# Patient Record
Sex: FEMALE | Race: WHITE | Employment: FULL TIME | RURAL
[De-identification: names, ages, dates, MRNs, and addresses within clinical notes are randomized per-mention and may not be internally consistent; named-entity substitution may affect disease eponyms.]

---

## 2024-06-14 PROBLEM — E66.811 CLASS 1 OBESITY DUE TO EXCESS CALORIES WITHOUT SERIOUS COMORBIDITY WITH BODY MASS INDEX (BMI) OF 31.0 TO 31.9 IN ADULT: Status: ACTIVE | Noted: 2024-06-14

## 2024-08-30 ENCOUNTER — OFFICE VISIT (OUTPATIENT)
Facility: CLINIC | Age: 24
End: 2024-08-30
Payer: COMMERCIAL

## 2024-08-30 VITALS
WEIGHT: 180 LBS | OXYGEN SATURATION: 100 % | DIASTOLIC BLOOD PRESSURE: 77 MMHG | TEMPERATURE: 98 F | BODY MASS INDEX: 31.89 KG/M2 | SYSTOLIC BLOOD PRESSURE: 112 MMHG | RESPIRATION RATE: 16 BRPM | HEART RATE: 84 BPM | HEIGHT: 63 IN

## 2024-08-30 DIAGNOSIS — K21.9 GASTROESOPHAGEAL REFLUX DISEASE WITHOUT ESOPHAGITIS: Primary | ICD-10-CM

## 2024-08-30 PROCEDURE — 99214 OFFICE O/P EST MOD 30 MIN: CPT | Performed by: FAMILY MEDICINE

## 2024-08-30 RX ORDER — SUCRALFATE 1 G/1
1 TABLET ORAL 4 TIMES DAILY
Qty: 120 TABLET | Refills: 2 | Status: SHIPPED | OUTPATIENT
Start: 2024-08-30

## 2024-08-30 NOTE — PROGRESS NOTES
Chief Complaint   Patient presents with    GI Problem     Eating hurts stomach, nausea. Occasional vomiting. Worse within the past 2-3 weeks. Ongoing for awhile.         \"Have you been to the ER, urgent care clinic since your last visit?  Hospitalized since your last visit?\"    NO    “Have you seen or consulted any other health care providers outside of [facility] since your last visit?”    NO     “Have you had a pap smear?”    NO    No cervical cancer screening on file             Click Here for Release of Records Request     Health Maintenance Due   Topic Date Due    Hepatitis C screen  Never done    DTaP/Tdap/Td vaccine (1 - Tdap) Never done    Pap smear  Never done    COVID-19 Vaccine (1 - 2023-24 season) Never done    Flu vaccine (1) Never done

## 2024-11-13 ENCOUNTER — APPOINTMENT (OUTPATIENT)
Facility: HOSPITAL | Age: 24
End: 2024-11-13
Payer: COMMERCIAL

## 2024-11-13 ENCOUNTER — HOSPITAL ENCOUNTER (EMERGENCY)
Facility: HOSPITAL | Age: 24
Discharge: HOME | End: 2024-11-13
Attending: EMERGENCY MEDICINE
Payer: COMMERCIAL

## 2024-11-13 VITALS
WEIGHT: 180 LBS | HEART RATE: 78 BPM | SYSTOLIC BLOOD PRESSURE: 122 MMHG | HEIGHT: 63 IN | OXYGEN SATURATION: 98 % | TEMPERATURE: 98.3 F | DIASTOLIC BLOOD PRESSURE: 75 MMHG | RESPIRATION RATE: 18 BRPM | BODY MASS INDEX: 31.89 KG/M2

## 2024-11-13 DIAGNOSIS — K21.9 GASTROESOPHAGEAL REFLUX DISEASE WITHOUT ESOPHAGITIS: ICD-10-CM

## 2024-11-13 DIAGNOSIS — R07.9 CHEST PAIN, UNSPECIFIED TYPE: Primary | ICD-10-CM

## 2024-11-13 LAB
ALBUMIN SERPL-MCNC: 4.2 G/DL (ref 3.5–5)
ALBUMIN/GLOB SERPL: 1.1 (ref 1.1–2.2)
ALP SERPL-CCNC: 71 U/L (ref 45–117)
ALT SERPL-CCNC: 18 U/L (ref 12–78)
ANION GAP SERPL CALC-SCNC: 7 MMOL/L (ref 2–12)
AST SERPL-CCNC: 11 U/L (ref 15–37)
BASOPHILS # BLD: 0.1 K/UL (ref 0–0.1)
BASOPHILS NFR BLD: 1 % (ref 0–1)
BILIRUB SERPL-MCNC: 0.2 MG/DL (ref 0.2–1)
BUN SERPL-MCNC: 15 MG/DL (ref 6–20)
BUN/CREAT SERPL: 18 (ref 12–20)
CALCIUM SERPL-MCNC: 9.1 MG/DL (ref 8.5–10.1)
CHLORIDE SERPL-SCNC: 104 MMOL/L (ref 97–108)
CO2 SERPL-SCNC: 28 MMOL/L (ref 21–32)
CREAT SERPL-MCNC: 0.84 MG/DL (ref 0.55–1.02)
DIFFERENTIAL METHOD BLD: ABNORMAL
EOSINOPHIL # BLD: 0.4 K/UL (ref 0–0.4)
EOSINOPHIL NFR BLD: 6 % (ref 0–7)
ERYTHROCYTE [DISTWIDTH] IN BLOOD BY AUTOMATED COUNT: 12.6 % (ref 11.5–14.5)
GLOBULIN SER CALC-MCNC: 3.7 G/DL (ref 2–4)
GLUCOSE SERPL-MCNC: 91 MG/DL (ref 65–100)
HCG SERPL QL: NEGATIVE
HCT VFR BLD AUTO: 39.1 % (ref 35–47)
HGB BLD-MCNC: 13.2 G/DL (ref 11.5–16)
IMM GRANULOCYTES # BLD AUTO: 0.1 K/UL (ref 0–0.04)
IMM GRANULOCYTES NFR BLD AUTO: 1 % (ref 0–0.5)
LYMPHOCYTES # BLD: 2.7 K/UL (ref 0.8–3.5)
LYMPHOCYTES NFR BLD: 34 % (ref 12–49)
MCH RBC QN AUTO: 31.2 PG (ref 26–34)
MCHC RBC AUTO-ENTMCNC: 33.8 G/DL (ref 30–36.5)
MCV RBC AUTO: 92.4 FL (ref 80–99)
MONOCYTES # BLD: 0.6 K/UL (ref 0–1)
MONOCYTES NFR BLD: 8 % (ref 5–13)
NEUTS SEG # BLD: 4.1 K/UL (ref 1.8–8)
NEUTS SEG NFR BLD: 50 % (ref 32–75)
NRBC # BLD: 0 K/UL (ref 0–0.01)
NRBC BLD-RTO: 0 PER 100 WBC
PLATELET # BLD AUTO: 280 K/UL (ref 150–400)
PMV BLD AUTO: 9.7 FL (ref 8.9–12.9)
POTASSIUM SERPL-SCNC: 3.5 MMOL/L (ref 3.5–5.1)
PROT SERPL-MCNC: 7.9 G/DL (ref 6.4–8.2)
RBC # BLD AUTO: 4.23 M/UL (ref 3.8–5.2)
SODIUM SERPL-SCNC: 139 MMOL/L (ref 136–145)
WBC # BLD AUTO: 8 K/UL (ref 3.6–11)

## 2024-11-13 PROCEDURE — 71260 CT THORAX DX C+: CPT

## 2024-11-13 PROCEDURE — 84703 CHORIONIC GONADOTROPIN ASSAY: CPT

## 2024-11-13 PROCEDURE — 93005 ELECTROCARDIOGRAM TRACING: CPT | Performed by: EMERGENCY MEDICINE

## 2024-11-13 PROCEDURE — 85025 COMPLETE CBC W/AUTO DIFF WBC: CPT

## 2024-11-13 PROCEDURE — 80053 COMPREHEN METABOLIC PANEL: CPT

## 2024-11-13 PROCEDURE — 36415 COLL VENOUS BLD VENIPUNCTURE: CPT

## 2024-11-13 PROCEDURE — 99285 EMERGENCY DEPT VISIT HI MDM: CPT

## 2024-11-13 PROCEDURE — 6360000004 HC RX CONTRAST MEDICATION: Performed by: EMERGENCY MEDICINE

## 2024-11-13 RX ORDER — IOPAMIDOL 755 MG/ML
100 INJECTION, SOLUTION INTRAVASCULAR
Status: COMPLETED | OUTPATIENT
Start: 2024-11-13 | End: 2024-11-13

## 2024-11-13 RX ADMIN — IOPAMIDOL 100 ML: 755 INJECTION, SOLUTION INTRAVENOUS at 17:24

## 2024-11-13 ASSESSMENT — PAIN SCALES - GENERAL: PAINLEVEL_OUTOF10: 4

## 2024-11-13 ASSESSMENT — PAIN DESCRIPTION - DESCRIPTORS: DESCRIPTORS: ACHING

## 2024-11-13 ASSESSMENT — LIFESTYLE VARIABLES: HOW OFTEN DO YOU HAVE A DRINK CONTAINING ALCOHOL: NEVER

## 2024-11-13 ASSESSMENT — ENCOUNTER SYMPTOMS
VOMITING: 0
COUGH: 0
SHORTNESS OF BREATH: 1
NAUSEA: 0
ABDOMINAL PAIN: 0
WHEEZING: 0

## 2024-11-13 ASSESSMENT — PAIN DESCRIPTION - ORIENTATION: ORIENTATION: MID

## 2024-11-13 ASSESSMENT — PAIN - FUNCTIONAL ASSESSMENT: PAIN_FUNCTIONAL_ASSESSMENT: ACTIVITIES ARE NOT PREVENTED

## 2024-11-13 ASSESSMENT — PAIN DESCRIPTION - LOCATION: LOCATION: CHEST

## 2024-11-13 NOTE — ED PROVIDER NOTES
status:      Spouse name: None    Number of children: None    Years of education: None    Highest education level: None   Tobacco Use    Smoking status: Never    Smokeless tobacco: Never   Vaping Use    Vaping status: Never Used   Substance and Sexual Activity    Alcohol use: Never    Drug use: Never    Sexual activity: Defer     Social Determinants of Health     Financial Resource Strain: Low Risk  (6/14/2024)    Overall Financial Resource Strain (CARDIA)     Difficulty of Paying Living Expenses: Not hard at all   Food Insecurity: No Food Insecurity (6/14/2024)    Hunger Vital Sign     Worried About Running Out of Food in the Last Year: Never true     Ran Out of Food in the Last Year: Never true   Transportation Needs: Unknown (6/14/2024)    PRAPARE - Transportation     Lack of Transportation (Non-Medical): No   Physical Activity: Unknown (6/11/2024)    Exercise Vital Sign     Days of Exercise per Week: Patient declined   Housing Stability: Unknown (6/14/2024)    Housing Stability Vital Sign     Unstable Housing in the Last Year: No       SCREENINGS         Tolstoy Coma Scale  Eye Opening: Spontaneous  Best Verbal Response: Oriented  Best Motor Response: Obeys commands  Tolstoy Coma Scale Score: 15                     CIWA Assessment  BP: (!) 140/79  Pulse: 98                 PHYSICAL EXAM       ED Triage Vitals [11/13/24 1613]   BP Systolic BP Percentile Diastolic BP Percentile Temp Temp Source Pulse Respirations SpO2   (!) 140/79 -- -- 98.3 °F (36.8 °C) Oral 98 20 100 %      Height Weight - Scale         1.6 m (5' 3\") 81.6 kg (180 lb)             Physical Exam  Vitals and nursing note reviewed.   Constitutional:       Appearance: Normal appearance.   HENT:      Head: Normocephalic and atraumatic.      Nose: Nose normal.      Mouth/Throat:      Mouth: Mucous membranes are moist.   Eyes:      Extraocular Movements: Extraocular movements intact.      Conjunctiva/sclera: Conjunctivae normal.      Pupils:

## 2024-11-13 NOTE — ED TRIAGE NOTES
Patient presents ambulatory to treatment area with a steady gait.  Patient states she had an endoscopy recently. States since then, she has had shortness of breath when raising her arms, as well as chest pain that radiates into her back.  Procedure was Monday. Symptoms began Tuesday. Denies nausea or vomiting.

## 2024-11-14 LAB
EKG ATRIAL RATE: 82 BPM
EKG DIAGNOSIS: NORMAL
EKG P AXIS: 41 DEGREES
EKG P-R INTERVAL: 160 MS
EKG Q-T INTERVAL: 368 MS
EKG QRS DURATION: 82 MS
EKG QTC CALCULATION (BAZETT): 429 MS
EKG R AXIS: 22 DEGREES
EKG T AXIS: 13 DEGREES
EKG VENTRICULAR RATE: 82 BPM

## 2024-11-14 PROCEDURE — 93010 ELECTROCARDIOGRAM REPORT: CPT | Performed by: INTERNAL MEDICINE

## 2025-07-24 ENCOUNTER — OFFICE VISIT (OUTPATIENT)
Facility: CLINIC | Age: 25
End: 2025-07-24

## 2025-07-24 VITALS
OXYGEN SATURATION: 100 % | SYSTOLIC BLOOD PRESSURE: 118 MMHG | TEMPERATURE: 98.4 F | BODY MASS INDEX: 35.97 KG/M2 | RESPIRATION RATE: 16 BRPM | HEIGHT: 63 IN | DIASTOLIC BLOOD PRESSURE: 75 MMHG | WEIGHT: 203 LBS | HEART RATE: 80 BPM

## 2025-07-24 DIAGNOSIS — R39.9 UTI SYMPTOMS: Primary | ICD-10-CM

## 2025-07-24 DIAGNOSIS — Z3A.01 LESS THAN 8 WEEKS GESTATION OF PREGNANCY: ICD-10-CM

## 2025-07-24 LAB
BILIRUBIN, URINE, POC: NEGATIVE
BLOOD URINE, POC: NEGATIVE
GLUCOSE URINE, POC: NEGATIVE
HCG, PREGNANCY, URINE, POC: POSITIVE
KETONES, URINE, POC: NEGATIVE
LEUKOCYTE ESTERASE, URINE, POC: NORMAL
NITRITE, URINE, POC: NEGATIVE
PH, URINE, POC: 5.5 (ref 4.6–8)
PROTEIN,URINE, POC: NEGATIVE
SPECIFIC GRAVITY, URINE, POC: 1.02 (ref 1–1.03)
URINALYSIS CLARITY, POC: CLEAR
URINALYSIS COLOR, POC: YELLOW
UROBILINOGEN, POC: NORMAL
VALID INTERNAL CONTROL, POC: YES

## 2025-07-24 RX ORDER — CEPHALEXIN 500 MG/1
500 CAPSULE ORAL 2 TIMES DAILY
Qty: 14 CAPSULE | Refills: 0 | Status: SHIPPED | OUTPATIENT
Start: 2025-07-24 | End: 2025-07-31

## 2025-07-24 RX ORDER — OMEPRAZOLE 40 MG/1
60 CAPSULE, DELAYED RELEASE ORAL DAILY
COMMUNITY
Start: 2024-12-06

## 2025-07-24 SDOH — ECONOMIC STABILITY: FOOD INSECURITY: WITHIN THE PAST 12 MONTHS, THE FOOD YOU BOUGHT JUST DIDN'T LAST AND YOU DIDN'T HAVE MONEY TO GET MORE.: NEVER TRUE

## 2025-07-24 SDOH — ECONOMIC STABILITY: FOOD INSECURITY: WITHIN THE PAST 12 MONTHS, YOU WORRIED THAT YOUR FOOD WOULD RUN OUT BEFORE YOU GOT MONEY TO BUY MORE.: NEVER TRUE

## 2025-07-24 ASSESSMENT — PATIENT HEALTH QUESTIONNAIRE - PHQ9
2. FEELING DOWN, DEPRESSED OR HOPELESS: NOT AT ALL
SUM OF ALL RESPONSES TO PHQ QUESTIONS 1-9: 0
1. LITTLE INTEREST OR PLEASURE IN DOING THINGS: NOT AT ALL
SUM OF ALL RESPONSES TO PHQ QUESTIONS 1-9: 0

## 2025-07-24 NOTE — PROGRESS NOTES
Chief Complaint   Patient presents with    Urinary Frequency     X5 days duration.     Groin Swelling     Left side. Reports 5 days duration.          \"Have you been to the ER, urgent care clinic since your last visit?  Hospitalized since your last visit?\"    NO    “Have you seen or consulted any other health care providers outside of [facility] since your last visit?”    NO     “Have you had a pap smear?”    NO              Click Here for Release of Records Request     Health Maintenance Due   Topic Date Due    Chlamydia/GC screen  Never done    Hepatitis C screen  Never done    Hepatitis B vaccine (1 of 3 - 19+ 3-dose series) Never done    DTaP/Tdap/Td vaccine (1 - Tdap) Never done    Pap smear  Never done    COVID-19 Vaccine (1 - 2024-25 season) Never done    Depression Screen  06/14/2025

## 2025-07-24 NOTE — PROGRESS NOTES
[facility] Medicine Res[facility]    Subjective   Guadalupe Dill is a 24 y.o. female who presents with urinary frequency and Incomplete voiding for the past 5 days and missed menses.    Frequency and incomplete bladder emptying:  Started 5d ago  No dysuria or burning with urination  No hematuria or change in urine color  Left labial swelling noted  No vesicles, ulcers, or yellow discharge  Reports left-sided abdominal cramping  Has recurrent history of UTIs  No history of STIs    Missed menses:  LMP: May 31  Monogamous, one partner in the last year  Sexually active, not using protection (planned pregnancy)      Review of Systems   Review of Systems   All other systems reviewed and are negative.    Medical History  No past medical history on file.    Medications  Current Outpatient Medications   Medication Sig    omeprazole (PRILOSEC) 40 MG delayed release capsule Take 60 capsules by mouth daily    Prenat MV-Min w/Fe-Folate-DHA (PRENATAL COMPLETE PO) Take 1 tablet by mouth daily    cephALEXin (KEFLEX) 500 MG capsule Take 1 capsule by mouth 2 times daily for 7 days    sucralfate (CARAFATE) 1 GM tablet Take 1 tablet by mouth 4 times daily (Patient not taking: Reported on 7/24/2025)     No current facility-administered medications for this visit.       Immunizations   Immunization History   Administered Date(s) Administered    HPV Quadrivalent (Gardasil) 11/20/2018, 02/05/2019    HPV, GARDASIL 9, (age 9y-45y), IM, 0.5mL 09/04/2018       Allergies   No Known Allergies    Objective   Vital Signs  /75 (BP Site: Left Upper Arm, Patient Position: Sitting, BP Cuff Size: Large Adult)   Pulse 80   Temp 98.4 °F (36.9 °C) (Oral)   Resp 16   Ht 1.6 m (5' 3\")   Wt 92.1 kg (203 lb)   LMP 05/31/2025 (Exact Date)   SpO2 100%   BMI 35.96 kg/m²     Physical Examination  General: Alert. No distress.   Cardio: Normal rate, regular rhythm. No murmur, rubs, or gallop.   Pulmonary:

## 2025-07-25 LAB
BACTERIA SPEC CULT: NORMAL
SERVICE CMNT-IMP: NORMAL

## 2025-07-28 LAB
C TRACH RRNA SPEC QL NAA+PROBE: NEGATIVE
N GONORRHOEA RRNA SPEC QL NAA+PROBE: NEGATIVE
SPECIMEN SOURCE: NORMAL
T VAGINALIS RRNA SPEC QL NAA+PROBE: NEGATIVE

## 2025-07-31 ENCOUNTER — INITIAL PRENATAL (OUTPATIENT)
Facility: CLINIC | Age: 25
End: 2025-07-31

## 2025-07-31 ENCOUNTER — HOSPITAL ENCOUNTER (OUTPATIENT)
Facility: HOSPITAL | Age: 25
Setting detail: SPECIMEN
Discharge: HOME | End: 2025-07-31
Payer: COMMERCIAL

## 2025-07-31 VITALS
BODY MASS INDEX: 34.38 KG/M2 | OXYGEN SATURATION: 100 % | HEIGHT: 63 IN | TEMPERATURE: 97.9 F | RESPIRATION RATE: 16 BRPM | SYSTOLIC BLOOD PRESSURE: 136 MMHG | DIASTOLIC BLOOD PRESSURE: 79 MMHG | WEIGHT: 194 LBS | HEART RATE: 87 BPM

## 2025-07-31 DIAGNOSIS — Z12.4 CERVICAL CANCER SCREENING: ICD-10-CM

## 2025-07-31 DIAGNOSIS — Z34.91 INITIAL OBSTETRIC VISIT IN FIRST TRIMESTER: Primary | ICD-10-CM

## 2025-07-31 LAB
BILIRUBIN, URINE, POC: NEGATIVE
BLOOD URINE, POC: NORMAL
GLUCOSE URINE, POC: NEGATIVE
KETONES, URINE, POC: NEGATIVE
LEUKOCYTE ESTERASE, URINE, POC: NORMAL
NITRITE, URINE, POC: NEGATIVE
PH, URINE, POC: 6 (ref 4.6–8)
PROTEIN,URINE, POC: NEGATIVE
SPECIFIC GRAVITY, URINE, POC: 1.03 (ref 1–1.03)
URINALYSIS CLARITY, POC: CLEAR
URINALYSIS COLOR, POC: YELLOW
UROBILINOGEN, POC: NORMAL

## 2025-07-31 PROCEDURE — 0500F INITIAL PRENATAL CARE VISIT: CPT

## 2025-07-31 PROCEDURE — 88142 CYTOPATH C/V THIN LAYER: CPT

## 2025-08-01 LAB
ABO + RH BLD: NORMAL
BACTERIA SPEC CULT: NORMAL
BLOOD BANK CMNT PATIENT-IMP: NORMAL
BLOOD GROUP ANTIBODIES SERPL: NORMAL
ERYTHROCYTE [DISTWIDTH] IN BLOOD BY AUTOMATED COUNT: 13.3 % (ref 11.5–14.5)
EST. AVERAGE GLUCOSE BLD GHB EST-MCNC: 109 MG/DL
HBA1C MFR BLD: 5.4 % (ref 4–5.6)
HBV SURFACE AB SERPL IA-ACNC: NONREACTIVE MIU/ML
HBV SURFACE AG SER QL: NONREACTIVE
HCT VFR BLD AUTO: 36.2 % (ref 35–47)
HCV AB SER QL: NONREACTIVE
HGB BLD-MCNC: 11.6 G/DL (ref 11.5–16)
HIV 1+2 AB+HIV1 P24 AG SERPL QL IA: NONREACTIVE
HIV 1/2 RESULT COMMENT: NORMAL
MCH RBC QN AUTO: 31.1 PG (ref 26–34)
MCHC RBC AUTO-ENTMCNC: 32 G/DL (ref 30–36.5)
MCV RBC AUTO: 97.1 FL (ref 80–99)
NRBC # BLD: 0 K/UL (ref 0–0.01)
NRBC BLD-RTO: 0 PER 100 WBC
PLATELET # BLD AUTO: 239 K/UL (ref 150–400)
PMV BLD AUTO: 11 FL (ref 8.9–12.9)
RBC # BLD AUTO: 3.73 M/UL (ref 3.8–5.2)
RPR SER QL: NONREACTIVE
RUBV IGG SERPL IA-ACNC: 31.6 IU/ML
SERVICE CMNT-IMP: NORMAL
SPECIMEN EXP DATE BLD: NORMAL
TSH, 3RD GENERATION: 2.34 UIU/ML (ref 0.27–4.2)
WBC # BLD AUTO: 9.2 K/UL (ref 3.6–11)

## 2025-08-02 LAB
HBV CORE AB SERPL QL IA: NEGATIVE
VZV IGG SER IA-ACNC: NON REACTIVE

## 2025-08-03 LAB
HGB A MFR BLD: 97.1 % (ref 96.4–98.8)
HGB A2 MFR BLD COLUMN CHROM: 2.9 % (ref 1.8–3.2)
HGB F MFR BLD: 0 % (ref 0–2)
HGB FRACT BLD-IMP: NORMAL
HGB S MFR BLD: 0 %

## 2025-08-25 ENCOUNTER — ROUTINE PRENATAL (OUTPATIENT)
Facility: CLINIC | Age: 25
End: 2025-08-25

## 2025-08-25 VITALS
WEIGHT: 196 LBS | TEMPERATURE: 98.3 F | BODY MASS INDEX: 34.73 KG/M2 | HEART RATE: 71 BPM | DIASTOLIC BLOOD PRESSURE: 76 MMHG | RESPIRATION RATE: 18 BRPM | SYSTOLIC BLOOD PRESSURE: 117 MMHG | OXYGEN SATURATION: 100 % | HEIGHT: 63 IN

## 2025-08-25 DIAGNOSIS — E66.811 CLASS 1 OBESITY DUE TO EXCESS CALORIES WITHOUT SERIOUS COMORBIDITY WITH BODY MASS INDEX (BMI) OF 31.0 TO 31.9 IN ADULT: ICD-10-CM

## 2025-08-25 DIAGNOSIS — E66.09 CLASS 1 OBESITY DUE TO EXCESS CALORIES WITHOUT SERIOUS COMORBIDITY WITH BODY MASS INDEX (BMI) OF 31.0 TO 31.9 IN ADULT: ICD-10-CM

## 2025-08-25 DIAGNOSIS — Z3A.12 12 WEEKS GESTATION OF PREGNANCY: Primary | ICD-10-CM

## 2025-08-25 PROCEDURE — 0502F SUBSEQUENT PRENATAL CARE: CPT

## 2025-08-26 LAB — GLUCOSE 1H P 100 G GLC PO SERPL-MCNC: 96 MG/DL (ref 65–140)

## 2025-09-02 ENCOUNTER — PATIENT MESSAGE (OUTPATIENT)
Facility: CLINIC | Age: 25
End: 2025-09-02

## 2025-09-02 LAB
Lab: NEGATIVE
Lab: NORMAL
NTRA ALPHA-THALASSEMIA: NEGATIVE
NTRA BETA-HEMOGLOBINOPATHIES: NEGATIVE
NTRA CANAVAN DISEASE: NEGATIVE
NTRA CYSTIC FIBROSIS: NEGATIVE
NTRA DUCHENNE/BECKER MUSCULAR DYSTROPHY: NEGATIVE
NTRA FAMILIAL DYSAUTONOMIA: NEGATIVE
NTRA FRAGILE X SYNDROME: NEGATIVE
NTRA GALACTOSEMIA: NEGATIVE
NTRA GAUCHER DISEASE: NEGATIVE
NTRA MEDIUM CHAIN ACYL-COA DEHYDROGENASE DEFICIENCY: NEGATIVE
NTRA POLYCYSTIC KIDNEY DISEASE, AUTOSOMAL RECESSIVE: NEGATIVE
NTRA SMITH-LEMLI-OPITZ SYNDROME: NEGATIVE
NTRA SPINAL MUSCULAR ATROPHY: NEGATIVE
NTRA TAY-SACHS DISEASE: NEGATIVE